# Patient Record
Sex: MALE | Race: WHITE | NOT HISPANIC OR LATINO | Employment: FULL TIME | URBAN - METROPOLITAN AREA
[De-identification: names, ages, dates, MRNs, and addresses within clinical notes are randomized per-mention and may not be internally consistent; named-entity substitution may affect disease eponyms.]

---

## 2022-09-23 ENCOUNTER — OFFICE VISIT (OUTPATIENT)
Dept: UROLOGY | Facility: CLINIC | Age: 57
End: 2022-09-23

## 2022-09-23 VITALS — RESPIRATION RATE: 14 BRPM | BODY MASS INDEX: 25.19 KG/M2 | WEIGHT: 186 LBS | HEIGHT: 72 IN

## 2022-09-23 DIAGNOSIS — R97.20 ELEVATED PROSTATE SPECIFIC ANTIGEN (PSA): Primary | ICD-10-CM

## 2022-09-23 LAB
BILIRUB BLD-MCNC: NEGATIVE MG/DL
CLARITY, POC: CLEAR
COLOR UR: YELLOW
EXPIRATION DATE: NORMAL
GLUCOSE UR STRIP-MCNC: NEGATIVE MG/DL
KETONES UR QL: NEGATIVE
LEUKOCYTE EST, POC: NEGATIVE
Lab: NORMAL
NITRITE UR-MCNC: NEGATIVE MG/ML
PH UR: 7 [PH] (ref 5–8)
PROT UR STRIP-MCNC: NEGATIVE MG/DL
RBC # UR STRIP: NEGATIVE /UL
SP GR UR: 1.01 (ref 1–1.03)
UROBILINOGEN UR QL: NORMAL

## 2022-09-23 PROCEDURE — 81003 URINALYSIS AUTO W/O SCOPE: CPT | Performed by: NURSE PRACTITIONER

## 2022-09-23 PROCEDURE — 99203 OFFICE O/P NEW LOW 30 MIN: CPT | Performed by: NURSE PRACTITIONER

## 2022-09-23 RX ORDER — LACTOBACILLUS ACIDOPHILUS 20B CELL
1 CAPSULE ORAL DAILY
Qty: 28 CAPSULE | Refills: 0 | Status: SHIPPED | OUTPATIENT
Start: 2022-09-23

## 2022-09-23 RX ORDER — MULTIPLE VITAMINS W/ MINERALS TAB 9MG-400MCG
1 TAB ORAL DAILY
COMMUNITY

## 2022-09-23 RX ORDER — LANSOPRAZOLE 15 MG/1
15 CAPSULE, DELAYED RELEASE ORAL DAILY
COMMUNITY

## 2022-09-23 RX ORDER — DOXYCYCLINE HYCLATE 100 MG/1
100 CAPSULE ORAL 2 TIMES DAILY
Qty: 56 CAPSULE | Refills: 0 | Status: SHIPPED | OUTPATIENT
Start: 2022-09-23 | End: 2022-10-21

## 2022-09-23 RX ORDER — FEXOFENADINE HYDROCHLORIDE 60 MG/1
60 TABLET, FILM COATED ORAL DAILY
COMMUNITY

## 2022-09-23 NOTE — PROGRESS NOTES
"Chief Complaint: Elevated PSA    Subjective         History of Present Illness  Jonh Moe is a 57 y.o. male presents to [facility] to be seen for elevated PSA.    The patient was seen to establish care with new PCP on 7/24/2022.  Patient had labs ordered at that date of service and his PSA level came back elevated at 4.4.    The patient had not seen a primary care provider for 11 years prior to establishing care with Sarita Vargas.    Nocturia x 0     Denies straining to void.     Weaker stream.  Sometimes slow.     Some post void dribble.    No urgency or frequency.     He does C/O ed state he can get an erection but it fades quickly.    Paternal history of prostate cancer and paternal uncle.    Objective     History reviewed. No pertinent past medical history.    Past Surgical History:   Procedure Laterality Date   • HERNIA REPAIR           Current Outpatient Medications:   •  fexofenadine (ALLEGRA) 60 MG tablet, Take 60 mg by mouth Daily., Disp: , Rfl:   •  lansoprazole (PREVACID) 15 MG capsule, Take 15 mg by mouth Daily., Disp: , Rfl:   •  multivitamin with minerals (MULTIVITAMIN ADULTS 50+ PO), Take 1 tablet by mouth Daily., Disp: , Rfl:   •  doxycycline (VIBRAMYCIN) 100 MG capsule, Take 1 capsule by mouth 2 (Two) Times a Day for 28 days., Disp: 56 capsule, Rfl: 0  •  Lactobacillus (Florajen Acidophilus) capsule, Take 1 capsule by mouth Daily., Disp: 28 capsule, Rfl: 0    No Known Allergies     History reviewed. No pertinent family history.    Social History     Socioeconomic History   • Marital status:    Tobacco Use   • Smoking status: Former Smoker   • Smokeless tobacco: Never Used   Vaping Use   • Vaping Use: Never used       Vital Signs:   Resp 14   Ht 182.9 cm (72\")   Wt 84.4 kg (186 lb)   BMI 25.23 kg/m²      Physical Exam  Genitourinary:     Prostate: Enlarged (moderate). Not tender and no nodules present.      Comments: boggy texture of prostate     "     Result Review :   The following data was reviewed by: TONJA Juarez on 09/23/2022:  Results for orders placed or performed in visit on 09/23/22   POC Urinalysis Dipstick, Automated    Specimen: Urine   Result Value Ref Range    Color Yellow Yellow, Straw, Dark Yellow, Erin    Clarity, UA Clear Clear    Specific Gravity  1.010 1.005 - 1.030    pH, Urine 7.0 5.0 - 8.0    Leukocytes Negative Negative    Nitrite, UA Negative Negative    Protein, POC Negative Negative mg/dL    Glucose, UA Negative Negative mg/dL    Ketones, UA Negative Negative    Urobilinogen, UA 0.2 E.U./dL Normal, 0.2 E.U./dL    Bilirubin Negative Negative    Blood, UA Negative Negative    Lot Number [id number]     Expiration Date 8/2,023             Procedures        Assessment and Plan    Diagnoses and all orders for this visit:    1. Elevated prostate specific antigen (PSA) (Primary)  -     POC Urinalysis Dipstick, Automated  -     doxycycline (VIBRAMYCIN) 100 MG capsule; Take 1 capsule by mouth 2 (Two) Times a Day for 28 days.  Dispense: 56 capsule; Refill: 0  -     Lactobacillus (Florajen Acidophilus) capsule; Take 1 capsule by mouth Daily.  Dispense: 28 capsule; Refill: 0  -     PSA Diagnostic; Future      Given his prostate exam and elevated PSA I would like to try treating him with antibiotics prior to repeating his PSA in 6 weeks.        I spent 15 minutes caring for Jonh on this date of service. This time includes time spent by me in the following activities:reviewing tests, obtaining and/or reviewing a separately obtained history, performing a medically appropriate examination and/or evaluation , counseling and educating the patient/family/caregiver, ordering medications, tests, or procedures, and documenting information in the medical record  Follow Up   Return in about 6 weeks (around 11/4/2022) for f/u elevated PSA With Dr. montana.  Patient was given instructions and counseling regarding his condition or for health  maintenance advice. Please see specific information pulled into the AVS if appropriate.         This document has been electronically signed by TONJA Juarez  September 23, 2022 15:09 EDT

## 2022-11-01 ENCOUNTER — LAB (OUTPATIENT)
Dept: LAB | Facility: HOSPITAL | Age: 57
End: 2022-11-01

## 2022-11-01 DIAGNOSIS — R97.20 ELEVATED PROSTATE SPECIFIC ANTIGEN (PSA): ICD-10-CM

## 2022-11-01 PROCEDURE — 36415 COLL VENOUS BLD VENIPUNCTURE: CPT

## 2022-11-01 PROCEDURE — 84153 ASSAY OF PSA TOTAL: CPT

## 2022-11-02 LAB — PSA SERPL-MCNC: 4.15 NG/ML (ref 0–4)

## 2022-11-14 PROBLEM — R97.20 ELEVATED PSA: Status: ACTIVE | Noted: 2022-11-14

## 2022-11-14 NOTE — PROGRESS NOTES
Chief Complaint: Urologic complaint    Subjective         History of Present Illness  Jonh Moe is a 57 y.o. male       Elevated PSA      9/23/2022 saw nurse practitioner -doxycycline x1 month -can tell things are better better stream.  No side effects    Has not seen PCP for 11 years before this    Nocturia x0, weak stream  - no  straining.  No urgency/frequency.    Some trouble with losing erection quickly.    Paternal history of prostate cancer in 60s.       No Gross hematuria/ UTI    No cardiopulmonary history, no anticoagulation.  Non-smoker      PSA    11/22     4.1  7/22       4.4          Objective     No past medical history on file.    Past Surgical History:   Procedure Laterality Date   • HERNIA REPAIR           Current Outpatient Medications:   •  fexofenadine (ALLEGRA) 60 MG tablet, Take 60 mg by mouth Daily., Disp: , Rfl:   •  Lactobacillus (Florajen Acidophilus) capsule, Take 1 capsule by mouth Daily., Disp: 28 capsule, Rfl: 0  •  lansoprazole (PREVACID) 15 MG capsule, Take 15 mg by mouth Daily., Disp: , Rfl:   •  multivitamin with minerals (MULTIVITAMIN ADULTS 50+ PO), Take 1 tablet by mouth Daily., Disp: , Rfl:     No Known Allergies     No family history on file.    Social History     Socioeconomic History   • Marital status:    Tobacco Use   • Smoking status: Former   • Smokeless tobacco: Never   Vaping Use   • Vaping Use: Never used       Vital Signs:   There were no vitals taken for this visit.     Physical exam    Alert and orient x3  Well appearing, well developed, in no acute distress   Unlabored respirations  Nontender/nondistended      Grossly oriented to person, place and time, judgment is intact, normal mood and affect      Results for orders placed or performed in visit on 11/01/22   PSA Diagnostic    Specimen: Blood   Result Value Ref Range    PSA 4.150 (H) 0.000 - 4.000 ng/mL             Assessment and Plan    Diagnoses and all orders for this visit:    1. Elevated  PSA (Primary)       Records reviewed today and summarized in chart      Patient with elevated PSA for his age.  It has come down marginally since his antibiotics.  Still elevated.  We discussed this today.  We did discuss MRI prostate versus conservatively managing with serial PSAs.    AT this time after discussion we will go ahead and get a PSA in 3 months.  Depending on what this PSA shows we will determine further management    Patient understands we are ruling out prostate cancer and he must follow-up.

## 2022-11-15 ENCOUNTER — OFFICE VISIT (OUTPATIENT)
Dept: UROLOGY | Facility: CLINIC | Age: 57
End: 2022-11-15

## 2022-11-15 VITALS — HEIGHT: 72 IN | BODY MASS INDEX: 25.14 KG/M2 | WEIGHT: 185.6 LBS | RESPIRATION RATE: 14 BRPM

## 2022-11-15 DIAGNOSIS — R97.20 ELEVATED PSA: Primary | ICD-10-CM

## 2022-11-15 PROCEDURE — 99213 OFFICE O/P EST LOW 20 MIN: CPT | Performed by: UROLOGY

## 2023-02-13 ENCOUNTER — TELEPHONE (OUTPATIENT)
Dept: SURGERY | Facility: CLINIC | Age: 58
End: 2023-02-13
Payer: COMMERCIAL

## 2023-02-13 DIAGNOSIS — R97.20 ELEVATED PSA: Primary | ICD-10-CM

## 2023-02-20 ENCOUNTER — LAB (OUTPATIENT)
Dept: LAB | Facility: HOSPITAL | Age: 58
End: 2023-02-20
Payer: COMMERCIAL

## 2023-02-20 DIAGNOSIS — R97.20 ELEVATED PSA: ICD-10-CM

## 2023-02-20 LAB — PSA SERPL-MCNC: 5.82 NG/ML (ref 0–4)

## 2023-02-20 PROCEDURE — 84153 ASSAY OF PSA TOTAL: CPT

## 2023-02-20 PROCEDURE — 36415 COLL VENOUS BLD VENIPUNCTURE: CPT

## 2023-02-25 NOTE — PROGRESS NOTES
Chief Complaint: Urologic complaint    Subjective         History of Present Illness  Jonh Moe is a 58 y.o. male       Elevated PSA  Family history of prostate cancer      Patient's voiding issue.  No straining. Noc X 0. No prostate meds.  Not bothered      NO GH/UTI      9/23/2022 saw nurse practitioner -doxycycline x1 month -can tell things are better better stream.  No side effects      Some trouble with losing erection quickly.    Paternal history of prostate cancer in 60s.       No Gross hematuria/ UTI    No cardiopulmonary history, no anticoagulation.  Non-smoker      PSA    2/23       5.8   11/22     4.1  7/22       4.4          Objective     No past medical history on file.    Past Surgical History:   Procedure Laterality Date   • HERNIA REPAIR           Current Outpatient Medications:   •  fexofenadine (ALLEGRA) 60 MG tablet, Take 60 mg by mouth Daily., Disp: , Rfl:   •  Lactobacillus (Florajen Acidophilus) capsule, Take 1 capsule by mouth Daily., Disp: 28 capsule, Rfl: 0  •  lansoprazole (PREVACID) 15 MG capsule, Take 15 mg by mouth Daily., Disp: , Rfl:   •  multivitamin with minerals tablet tablet, Take 1 tablet by mouth Daily., Disp: , Rfl:     No Known Allergies     No family history on file.    Social History     Socioeconomic History   • Marital status:    Tobacco Use   • Smoking status: Former   • Smokeless tobacco: Never   Vaping Use   • Vaping Use: Never used       Vital Signs:   There were no vitals taken for this visit.           Results for orders placed or performed in visit on 02/20/23   PSA Diagnostic    Specimen: Blood   Result Value Ref Range    PSA 5.820 (H) 0.000 - 4.000 ng/mL             Assessment and Plan    Diagnoses and all orders for this visit:    1. Elevated PSA (Primary)        PSA has increased, he does have have a family history of prostate cancer.  After discussion we will get an MRI prostate and follow-up after      Patient understands  we are ruling out  prostate cancer and he must follow-up

## 2023-02-27 ENCOUNTER — OFFICE VISIT (OUTPATIENT)
Dept: UROLOGY | Facility: CLINIC | Age: 58
End: 2023-02-27
Payer: COMMERCIAL

## 2023-02-27 VITALS — WEIGHT: 189.4 LBS | HEIGHT: 72 IN | RESPIRATION RATE: 14 BRPM | BODY MASS INDEX: 25.65 KG/M2

## 2023-02-27 DIAGNOSIS — R97.20 ELEVATED PSA: Primary | ICD-10-CM

## 2023-02-27 PROCEDURE — 99213 OFFICE O/P EST LOW 20 MIN: CPT | Performed by: UROLOGY

## 2023-02-27 RX ORDER — CHLORAL HYDRATE 500 MG
CAPSULE ORAL
COMMUNITY

## 2023-02-27 RX ORDER — MAGNESIUM OXIDE 400 MG/1
400 TABLET ORAL DAILY
COMMUNITY

## 2023-03-07 ENCOUNTER — TELEPHONE (OUTPATIENT)
Dept: UROLOGY | Facility: CLINIC | Age: 58
End: 2023-03-07
Payer: COMMERCIAL

## 2023-03-07 DIAGNOSIS — R97.20 ELEVATED PSA: Primary | ICD-10-CM

## 2023-03-07 NOTE — TELEPHONE ENCOUNTER
Spoke to pt spouse giving her MRI Prostate appt information. [facility] 03/24/23 at 0900, arrive at 0830. No prep. She also agreed to move follow up to 03/28/23 at 4:00pm.

## 2023-03-26 NOTE — PROGRESS NOTES
Chief Complaint: Urologic complaint    Subjective         History of Present Illness  Jonh Moe is a 58 y.o. male       Elevated PSA  Family history of prostate cancer  ED    No changes to his medical history    Follows up today with MRI prostate    Voiding unchanged    Patient's voiding issue.  No straining. Noc X 0. No prostate meds.  Not bothered    No symptoms of prostatitis    9/23/2022 saw nurse practitioner -doxycycline x1 month -can tell things are better better stream.  No side effects    Some trouble with losing erection quickly. No treatment currenlty    Paternal history of prostate cancer in 60s.         No cardiopulmonary history, no anticoagulation.  Non-smoker      PSA    3/24/2023 MRI prostate-72 g, negative, chronic prostatitis   PSAd   0.08  2/23       5.8  -after doxycycline x1 month  11/22     4.1  7/22       4.4          Objective     No past medical history on file.    Past Surgical History:   Procedure Laterality Date   • HERNIA REPAIR           Current Outpatient Medications:   •  fexofenadine (ALLEGRA) 60 MG tablet, Take 60 mg by mouth Daily., Disp: , Rfl:   •  Inulin-Cholecalciferol (FIBER/D3 ADULT GUMMIES PO), Take  by mouth., Disp: , Rfl:   •  Lactobacillus (Florajen Acidophilus) capsule, Take 1 capsule by mouth Daily., Disp: 28 capsule, Rfl: 0  •  lansoprazole (PREVACID) 15 MG capsule, Take 15 mg by mouth Daily., Disp: , Rfl:   •  magnesium oxide (MAG-OX) 400 MG tablet, Take 400 mg by mouth Daily., Disp: , Rfl:   •  multivitamin with minerals tablet tablet, Take 1 tablet by mouth Daily., Disp: , Rfl:   •  Omega-3 Fatty Acids (fish oil) 1000 MG capsule capsule, Take  by mouth Daily With Breakfast., Disp: , Rfl:     No Known Allergies     No family history on file.    Social History     Socioeconomic History   • Marital status:    Tobacco Use   • Smoking status: Former   • Smokeless tobacco: Never   Vaping Use   • Vaping Use: Never used       Vital Signs:   There were no  vitals taken for this visit.           Results for orders placed or performed in visit on 02/20/23   PSA Diagnostic    Specimen: Blood   Result Value Ref Range    PSA 5.820 (H) 0.000 - 4.000 ng/mL             Assessment and Plan    Diagnoses and all orders for this visit:    1. Elevated PSA (Primary)        ED      Patient bothered by ED, will go and give him a prescription for sildenafil 20 mg 1-5 tablets as needed 6 or intercourse.  Risk and benefits discussed.  Patient understands he has erection for greater than 4 hours should go to the emergency room or risk worse erections in the future        Elevated PSA      PSA within normal limits for his size of prostate based on MRI.  PSA density normal.  Patient given reassurance we will continue to follow  as he does have a family history of prostate cancer    I do think yearly PSA is reasonable at this point    Follow-up in 1 year with PSA  With NP

## 2023-03-28 ENCOUNTER — OFFICE VISIT (OUTPATIENT)
Dept: UROLOGY | Facility: CLINIC | Age: 58
End: 2023-03-28
Payer: COMMERCIAL

## 2023-03-28 VITALS — RESPIRATION RATE: 14 BRPM | HEIGHT: 72 IN | WEIGHT: 192.2 LBS | BODY MASS INDEX: 26.03 KG/M2

## 2023-03-28 DIAGNOSIS — N52.01 ERECTILE DYSFUNCTION DUE TO ARTERIAL INSUFFICIENCY: ICD-10-CM

## 2023-03-28 DIAGNOSIS — R97.20 ELEVATED PSA: Primary | ICD-10-CM

## 2023-03-28 PROCEDURE — 99213 OFFICE O/P EST LOW 20 MIN: CPT | Performed by: UROLOGY

## 2023-03-28 RX ORDER — SILDENAFIL CITRATE 20 MG/1
TABLET ORAL
Qty: 30 TABLET | Refills: 10 | Status: SHIPPED | OUTPATIENT
Start: 2023-03-28

## 2023-09-18 ENCOUNTER — OFFICE VISIT (OUTPATIENT)
Dept: ORTHOPEDIC SURGERY | Facility: CLINIC | Age: 58
End: 2023-09-18
Payer: COMMERCIAL

## 2023-09-18 VITALS
OXYGEN SATURATION: 98 % | HEIGHT: 72 IN | HEART RATE: 89 BPM | WEIGHT: 192 LBS | DIASTOLIC BLOOD PRESSURE: 89 MMHG | SYSTOLIC BLOOD PRESSURE: 167 MMHG | BODY MASS INDEX: 26.01 KG/M2

## 2023-09-18 DIAGNOSIS — M25.561 RIGHT KNEE PAIN, UNSPECIFIED CHRONICITY: Primary | ICD-10-CM

## 2023-09-18 NOTE — PROGRESS NOTES
"Chief Complaint  Initial Evaluation of the Right Knee     Subjective      Jonh Moe presents to [facility] for initial evaluation of the right knee. He has pain on the medial aspect of the knee. He has had pain for awhile.  He feels he tore something 7 - 8 years ago.  He treated conservatively with bracing at that time.  He has difficulty with twisting, turning, uneven surfaces and stairs.     No Known Allergies     Social History     Socioeconomic History    Marital status:    Tobacco Use    Smoking status: Former     Types: Cigarettes    Smokeless tobacco: Never    Tobacco comments:     Quit smoking about 20 years ago   Vaping Use    Vaping Use: Never used   Substance and Sexual Activity    Alcohol use: Yes     Alcohol/week: 2.0 standard drinks     Types: 2 Shots of liquor per week    Drug use: Never    Sexual activity: Yes     Partners: Female     Birth control/protection: Hysterectomy        I reviewed the patient's chief complaint, history of present illness, review of systems, past medical history, surgical history, family history, social history, medications, and allergy list.     Review of Systems     Constitutional: Denies fevers, chills, weight loss  Cardiovascular: Denies chest pain, shortness of breath  Skin: Denies rashes, acute skin changes  Neurologic: Denies headache, loss of consciousness  M      Vital Signs:   /89   Pulse 89   Ht 182.9 cm (72\")   Wt 87.1 kg (192 lb)   SpO2 98%   BMI 26.04 kg/m²          Physical Exam  General: Alert. No acute distress    Ortho Exam        RIGHT KNEE Flexion 120. Extension 0. Stable to varus/valgus stress. Stable to anterior/posterior drawer. Neurovascularly intact. Negative Carmen. Negative Lachman. Positive EHL, FHL, HS and TA. Sensation intact to light touch all 5 nerves of the foot. Ambulates with Antalgic gait. Patella is well tracking. Calf supple, non-tender. Positive tenderness to the medial joint " line. Negative tenderness to the lateral joint line. Negative Crepitus. Good strength to hamstrings, quadriceps, dorsiflexors, and plantar flexors.  Knee Extensor Mechanism intact        Procedures        Imaging Results (Most Recent)       None             Result Review :       PROCEDURE: XR KNEE 3 VIEWS 33877  REASON FOR EXAM: Pain  COMPARISON: None.  FINDINGS: Bone demineralization. No acute fracture or dislocation.  Mild tricompartmental knee joint space narrowing.  No acute or suspicious soft tissue abnormality.  IMPRESSION: 1. No acute fracture or dislocation. 2. Mild tricompartmental knee joint space narrowing.       Assessment and Plan     Diagnoses and all orders for this visit:    1. Right knee pain, unspecified chronicity (Primary)        Discussed the treatment plan with the patient. I reviewed the X-rays that were obtained on CD with the patient.       MRI of the right knee.       Call or return if worsening symptoms.    Follow Up     After MRI of the right knee.       Patient was given instructions and counseling regarding his condition or for health maintenance advice. Please see specific information pulled into the AVS if appropriate.     Scribed for Tanner Pacheco MD by Thu Jacob MA.  09/18/23   15:42 EDT    I have personally performed the services described in this document as scribed by the above individual and it is both accurate and complete. Tanner Pacheco MD 09/20/23

## 2023-10-25 ENCOUNTER — HOSPITAL ENCOUNTER (OUTPATIENT)
Dept: MRI IMAGING | Facility: HOSPITAL | Age: 58
Discharge: HOME | End: 2023-10-25
Admitting: ORTHOPAEDIC SURGERY
Payer: COMMERCIAL

## 2023-10-25 DIAGNOSIS — M25.561 RIGHT KNEE PAIN, UNSPECIFIED CHRONICITY: ICD-10-CM

## 2023-10-25 PROCEDURE — 73721 MRI JNT OF LWR EXTRE W/O DYE: CPT

## 2024-03-19 ENCOUNTER — LAB (OUTPATIENT)
Dept: LAB | Facility: HOSPITAL | Age: 59
End: 2024-03-19
Payer: COMMERCIAL

## 2024-03-19 DIAGNOSIS — R97.20 ELEVATED PSA: ICD-10-CM

## 2024-03-19 DIAGNOSIS — R97.20 ELEVATED PSA: Primary | ICD-10-CM

## 2024-03-19 LAB — PSA SERPL-MCNC: 6.32 NG/ML (ref 0–4)

## 2024-03-19 PROCEDURE — 36415 COLL VENOUS BLD VENIPUNCTURE: CPT

## 2024-03-19 PROCEDURE — 84153 ASSAY OF PSA TOTAL: CPT

## 2024-03-27 NOTE — PROGRESS NOTES
Chief Complaint: Elevated PSA    Subjective         History of Present Illness  Jonh Moe is a 59 y.o. male presents to [facility] to be seen for follow-up.    Patient was previously seen by Dr. Bruce Ruano with last visit on 3/28/2023 for elevated PSA/ED.  He was given a prescription for sildenafil.  He was also advised that his PSA was within normal limits for the size of his prostate based on his MRI.  His PSA density was within normal limits.  He was advised to follow-up 1 year with PSA prior.  He is here for that follow-up.      Denies any urinary symptoms.         PSA  3/19/2024 6.32, PSAd 0.09    Previous 3/28/2023:  Elevated PSA  Family history of prostate cancer  ED     No changes to his medical history     Follows up today with MRI prostate     Voiding unchanged     Patient's voiding issue.  No straining. Noc X 0. No prostate meds.  Not bothered     No symptoms of prostatitis     9/23/2022 saw nurse practitioner -doxycycline x1 month -can tell things are better better stream.  No side effects     Some trouble with losing erection quickly. No treatment currenlty     Paternal history of prostate cancer in 60s.           No cardiopulmonary history, no anticoagulation.  Non-smoker        PSA     3/24/2023 MRI prostate-72 g, negative, chronic prostatitis   PSAd   0.08  2/23       5.8  -after doxycycline x1 month  11/22     4.1  7/22       4.4       Objective     History reviewed. No pertinent past medical history.    Past Surgical History:   Procedure Laterality Date    HERNIA REPAIR           Current Outpatient Medications:     fexofenadine (ALLEGRA) 60 MG tablet, Take 1 tablet by mouth Daily., Disp: , Rfl:     Inulin-Cholecalciferol (FIBER/D3 ADULT GUMMIES PO), Take  by mouth., Disp: , Rfl:     Lactobacillus (Florajen Acidophilus) capsule, Take 1 capsule by mouth Daily., Disp: 28 capsule, Rfl: 0    lansoprazole (PREVACID) 15 MG capsule, Take 1 capsule by mouth Daily.,  "Disp: , Rfl:     magnesium oxide (MAG-OX) 400 MG tablet, Take 1 tablet by mouth Daily., Disp: , Rfl:     multivitamin with minerals tablet tablet, Take 1 tablet by mouth Daily., Disp: , Rfl:     Omega-3 Fatty Acids (fish oil) 1000 MG capsule capsule, Take  by mouth Daily With Breakfast., Disp: , Rfl:     sildenafil (REVATIO) 20 MG tablet, TAKE 1-5 TABLETS BY MOUTH AS NEEDED 1 HOUR PRIOR TO SEXUAL INTERCOURSE, Disp: 30 tablet, Rfl: 10    doxycycline (VIBRAMYCIN) 100 MG capsule, Take 1 capsule by mouth 2 (Two) Times a Day for 28 days., Disp: 56 capsule, Rfl: 0    No Known Allergies     Family History   Problem Relation Age of Onset    Cancer Mother     Cancer Father         Acute Leukemia       Social History     Socioeconomic History    Marital status:    Tobacco Use    Smoking status: Former     Types: Cigarettes     Passive exposure: Past    Smokeless tobacco: Never    Tobacco comments:     Quit smoking about 20 years ago   Vaping Use    Vaping status: Never Used   Substance and Sexual Activity    Alcohol use: Yes     Alcohol/week: 2.0 standard drinks of alcohol     Types: 2 Shots of liquor per week    Drug use: Never    Sexual activity: Yes     Partners: Female     Birth control/protection: Hysterectomy       Vital Signs:   /87 (BP Location: Left arm, Patient Position: Sitting, Cuff Size: Large Adult)   Pulse 79   Ht 182.9 cm (72\")   Wt 87.1 kg (192 lb 0.3 oz)   BMI 26.04 kg/m²      Physical Exam  Vitals reviewed.   Constitutional:       Appearance: Normal appearance.   Neurological:      General: No focal deficit present.      Mental Status: He is alert and oriented to person, place, and time.   Psychiatric:         Mood and Affect: Mood normal.         Behavior: Behavior normal.          Result Review :   The following data was reviewed by: TONJA Crawley on 03/29/2024:  Results for orders placed or performed in visit on 03/29/24   Bladder Scan   Result Value Ref Range    Urine " Volume 2       PSA          3/19/2024    14:59   PSA   PSA 6.320        Bladder Scan interpretation 03/29/2024    Estimation of residual urine via BVI 3000 [address]athon Bladder Scan  MA/nurse performing: Palak GILLIAM MA  Residual Urine: 2 ml  Indication: Elevated PSA    Elevated prostate specific antigen (PSA)   Position: Supine  Examination: Incremental scanning of the suprapubic area using 2.0 MHz transducer using copious amounts of acoustic gel.   Findings: An anechoic area was demonstrated which represented the bladder, with measurement of residual urine as noted. I inspected this myself. In that the residual urine was stable or insignificant, refer to plan for treatment and plan necessary at this time.         Procedures        Assessment and Plan    Diagnoses and all orders for this visit:    1. Elevated PSA (Primary)  -     Bladder Scan  -     PSA DIAGNOSTIC; Future  -     doxycycline (VIBRAMYCIN) 100 MG capsule; Take 1 capsule by mouth 2 (Two) Times a Day for 28 days.  Dispense: 56 capsule; Refill: 0    2. Elevated prostate specific antigen (PSA)  -     Lactobacillus (Florajen Acidophilus) capsule; Take 1 capsule by mouth Daily.  Dispense: 28 capsule; Refill: 0    Discussed elevated PSA at length with patient.  We did discuss that having larger prostate may be the reason that his PSA continues to slowly climb.  However he would like to treat with an antibiotic and then repeat in a couple of months.  He did report that he works as a  and has bounced around a lot and feels this may be contributing to the elevations.  I did discuss that this is definitely a possibility.  He does have some vacation scheduled in May and would like to get his PSA completed again after a few days off of work to see if it improves.    We did discuss if the PSA remains elevated at that time that we would consider doing the [employer] urine test and if this was elevated to do a repeat MRI with possible biopsy.    I  will see him back in May or sooner for any new concerns.    Follow Up   Return in about 9 weeks (around 5/31/2024).  Patient was given instructions and counseling regarding his condition or for health maintenance advice. Please see specific information pulled into the AVS if appropriate.         This document has been electronically signed by TONJA Crawley  March 29, 2024 15:01 EDT

## 2024-03-29 ENCOUNTER — OFFICE VISIT (OUTPATIENT)
Dept: UROLOGY | Facility: CLINIC | Age: 59
End: 2024-03-29
Payer: COMMERCIAL

## 2024-03-29 VITALS
WEIGHT: 192.02 LBS | HEART RATE: 79 BPM | HEIGHT: 72 IN | DIASTOLIC BLOOD PRESSURE: 87 MMHG | BODY MASS INDEX: 26.01 KG/M2 | SYSTOLIC BLOOD PRESSURE: 164 MMHG

## 2024-03-29 DIAGNOSIS — R97.20 ELEVATED PSA: Primary | ICD-10-CM

## 2024-03-29 DIAGNOSIS — R97.20 ELEVATED PROSTATE SPECIFIC ANTIGEN (PSA): ICD-10-CM

## 2024-03-29 LAB — URINE VOLUME: 2

## 2024-03-29 RX ORDER — LACTOBACILLUS ACIDOPHILUS 20B CELL
1 CAPSULE ORAL DAILY
Qty: 28 CAPSULE | Refills: 0 | Status: SHIPPED | OUTPATIENT
Start: 2024-03-29

## 2024-03-29 RX ORDER — DOXYCYCLINE HYCLATE 100 MG/1
100 CAPSULE ORAL 2 TIMES DAILY
Qty: 56 CAPSULE | Refills: 0 | Status: SHIPPED | OUTPATIENT
Start: 2024-03-29 | End: 2024-04-26

## 2024-05-28 ENCOUNTER — LAB (OUTPATIENT)
Dept: LAB | Facility: HOSPITAL | Age: 59
End: 2024-05-28
Payer: COMMERCIAL

## 2024-05-28 DIAGNOSIS — R97.20 ELEVATED PSA: ICD-10-CM

## 2024-05-28 LAB — PSA SERPL-MCNC: 5.54 NG/ML (ref 0–4)

## 2024-05-28 PROCEDURE — 84153 ASSAY OF PSA TOTAL: CPT

## 2024-05-28 PROCEDURE — 36415 COLL VENOUS BLD VENIPUNCTURE: CPT

## 2024-05-31 NOTE — PROGRESS NOTES
Chief Complaint: Elevated PSA    Subjective         History of Present Illness  Jonh Moe is a 59 y.o. male presents to [facility] to be seen for follow-up.    Patient was previously seen by me with last visit on 3/29/2024 for elevated PSA.  At that visit he did want to treat with an antibiotic and then repeat his PSA to see if it had come back down.  He is here for follow-up.      He is not having any bothersome urinary symptoms.    PSA  5/28/2024 5.54, PSAd 0.08    Previous 3/29/2024:  Patient was previously seen by Dr. Bruce Ruano with last visit on 3/28/2023 for elevated PSA/ED.  He was given a prescription for sildenafil.  He was also advised that his PSA was within normal limits for the size of his prostate based on his MRI.  His PSA density was within normal limits.  He was advised to follow-up 1 year with PSA prior.  He is here for that follow-up.        Denies any urinary symptoms.            PSA  3/19/2024 6.32, PSAd 0.09     Previous 3/28/2023:  Elevated PSA  Family history of prostate cancer  ED     No changes to his medical history     Follows up today with MRI prostate     Voiding unchanged     Patient's voiding issue.  No straining. Noc X 0. No prostate meds.  Not bothered     No symptoms of prostatitis     9/23/2022 saw nurse practitioner -doxycycline x1 month -can tell things are better better stream.  No side effects     Some trouble with losing erection quickly. No treatment currenlty     Paternal history of prostate cancer in 60s.           No cardiopulmonary history, no anticoagulation.  Non-smoker        PSA     3/24/2023 MRI prostate-72 g, negative, chronic prostatitis   PSAd   0.08  2/23       5.8  -after doxycycline x1 month  11/22     4.1  7/22       4.4     Objective     History reviewed. No pertinent past medical history.    Past Surgical History:   Procedure Laterality Date    HERNIA REPAIR           Current Outpatient Medications:      "fexofenadine (ALLEGRA) 60 MG tablet, Take 1 tablet by mouth Daily., Disp: , Rfl:     Inulin-Cholecalciferol (FIBER/D3 ADULT GUMMIES PO), Take  by mouth., Disp: , Rfl:     Lactobacillus (Florajen Acidophilus) capsule, Take 1 capsule by mouth Daily., Disp: 28 capsule, Rfl: 0    lansoprazole (PREVACID) 15 MG capsule, Take 1 capsule by mouth Daily., Disp: , Rfl:     magnesium oxide (MAG-OX) 400 MG tablet, Take 1 tablet by mouth Daily., Disp: , Rfl:     multivitamin with minerals tablet tablet, Take 1 tablet by mouth Daily., Disp: , Rfl:     Omega-3 Fatty Acids (fish oil) 1000 MG capsule capsule, Take  by mouth Daily With Breakfast., Disp: , Rfl:     sildenafil (REVATIO) 20 MG tablet, TAKE 1-5 TABLETS BY MOUTH AS NEEDED 1 HOUR PRIOR TO SEXUAL INTERCOURSE (Patient not taking: Reported on 6/4/2024), Disp: 30 tablet, Rfl: 10    No Known Allergies     Family History   Problem Relation Age of Onset    Cancer Mother     Cancer Father         Acute Leukemia       Social History     Socioeconomic History    Marital status:    Tobacco Use    Smoking status: Former     Types: Cigarettes     Passive exposure: Past    Smokeless tobacco: Never    Tobacco comments:     Quit smoking about 20 years ago   Vaping Use    Vaping status: Never Used   Substance and Sexual Activity    Alcohol use: Yes     Alcohol/week: 2.0 standard drinks of alcohol     Types: 2 Shots of liquor per week    Drug use: Never    Sexual activity: Yes     Partners: Female     Birth control/protection: Hysterectomy       Vital Signs:   /85 (BP Location: Left arm, Patient Position: Sitting, Cuff Size: Large Adult)   Pulse 85   Ht 182.9 cm (72\")   Wt 87.1 kg (192 lb 0.3 oz)   BMI 26.04 kg/m²      Physical Exam  Vitals reviewed.   Constitutional:       Appearance: Normal appearance.   Neurological:      General: No focal deficit present.      Mental Status: He is alert and oriented to person, place, and time.   Psychiatric:         Mood and Affect: Mood " normal.         Behavior: Behavior normal.          Result Review :   The following data was reviewed by: TONJA Crawley on 06/04/2024:  Results for orders placed or performed in visit on 06/04/24   Bladder Scan   Result Value Ref Range    Urine Volume 46       PSA          3/19/2024    14:59 5/28/2024    09:15   PSA   PSA 6.320  5.540        Bladder Scan interpretation 06/04/2024    Estimation of residual urine via BVI 3000 Verathon Bladder Scan  MA/nurse performing: Palak GILLIAM MA  Residual Urine: 46 ml  Indication: Elevated PSA   Position: Supine  Examination: Incremental scanning of the suprapubic area using 2.0 MHz transducer using copious amounts of acoustic gel.   Findings: An anechoic area was demonstrated which represented the bladder, with measurement of residual urine as noted. I inspected this myself. In that the residual urine was stable or insignificant, refer to plan for treatment and plan necessary at this time.         Procedures        Assessment and Plan    Diagnoses and all orders for this visit:    1. Elevated PSA (Primary)  -     Bladder Scan  -     PSA DIAGNOSTIC; Future    Given that his PSA has come down slightly, we will just continue to monitor this closely.  We did discuss that it could be strictly related to the size of his prostate since his PSA density continues to be good.  He would like to repeat the PSA in 6 months and see where we are at that point.    Will see him back in 6 months with a PSA prior or sooner for any new concerns.    Follow Up   Return in about 6 months (around 12/4/2024) for with PSA prior.  Patient was given instructions and counseling regarding his condition or for health maintenance advice. Please see specific information pulled into the AVS if appropriate.         This document has been electronically signed by TONJA Crawley  June 4, 2024 15:10 EDT

## 2024-06-04 ENCOUNTER — OFFICE VISIT (OUTPATIENT)
Dept: UROLOGY | Facility: CLINIC | Age: 59
End: 2024-06-04
Payer: COMMERCIAL

## 2024-06-04 VITALS
SYSTOLIC BLOOD PRESSURE: 141 MMHG | BODY MASS INDEX: 26.01 KG/M2 | DIASTOLIC BLOOD PRESSURE: 85 MMHG | WEIGHT: 192.02 LBS | HEART RATE: 85 BPM | HEIGHT: 72 IN

## 2024-06-04 DIAGNOSIS — R97.20 ELEVATED PSA: Primary | ICD-10-CM

## 2024-06-04 LAB — URINE VOLUME: 46

## 2024-06-04 PROCEDURE — 51798 US URINE CAPACITY MEASURE: CPT | Performed by: NURSE PRACTITIONER

## 2024-06-04 PROCEDURE — 99213 OFFICE O/P EST LOW 20 MIN: CPT | Performed by: NURSE PRACTITIONER

## 2024-11-25 ENCOUNTER — LAB (OUTPATIENT)
Dept: LAB | Facility: HOSPITAL | Age: 59
End: 2024-11-25
Payer: COMMERCIAL

## 2024-11-25 DIAGNOSIS — R97.20 ELEVATED PSA: ICD-10-CM

## 2024-11-25 LAB — PSA SERPL-MCNC: 5.99 NG/ML (ref 0–4)

## 2024-11-25 PROCEDURE — 84153 ASSAY OF PSA TOTAL: CPT

## 2024-11-25 PROCEDURE — 36415 COLL VENOUS BLD VENIPUNCTURE: CPT

## 2024-12-02 NOTE — PROGRESS NOTES
Chief Complaint: Elevated PSA    Subjective         History of Present Illness  Jonh Moe is a 59 y.o. male presents to [facility] to be seen for follow-up.    Patient was previously seen by me with last visit on 6/4/2024 for elevated PSA.  At that visit his PSA had come down slightly so he wanted to monitor closely.  His PSA density remains good.  He is here for follow-up.    He is doing well and denies any bothersome urinary symptoms.        PSA  11/25/2024 5.99, PSAd 0.08      Previous 6/4/2024:  Patient was previously seen by me with last visit on 3/29/2024 for elevated PSA.  At that visit he did want to treat with an antibiotic and then repeat his PSA to see if it had come back down.  He is here for follow-up.        He is not having any bothersome urinary symptoms.     PSA  5/28/2024 5.54, PSAd 0.08     Previous 3/29/2024:  Patient was previously seen by Dr. Bruce Ruano with last visit on 3/28/2023 for elevated PSA/ED.  He was given a prescription for sildenafil.  He was also advised that his PSA was within normal limits for the size of his prostate based on his MRI.  His PSA density was within normal limits.  He was advised to follow-up 1 year with PSA prior.  He is here for that follow-up.        Denies any urinary symptoms.            PSA  3/19/2024 6.32, PSAd 0.09     Previous 3/28/2023:  Elevated PSA  Family history of prostate cancer  ED     No changes to his medical history     Follows up today with MRI prostate     Voiding unchanged     Patient's voiding issue.  No straining. Noc X 0. No prostate meds.  Not bothered     No symptoms of prostatitis     9/23/2022 saw nurse practitioner -doxycycline x1 month -can tell things are better better stream.  No side effects     Some trouble with losing erection quickly. No treatment currenlty     Paternal history of prostate cancer in 60s.           No cardiopulmonary history, no anticoagulation.  Non-smoker        PSA      3/24/2023 MRI prostate-72 g, negative, chronic prostatitis   PSAd   0.08  2/23       5.8  -after doxycycline x1 month  11/22     4.1  7/22       4.4          Objective     Past Medical History:   Diagnosis Date    Elevated PSA     Erectile dysfunction        Past Surgical History:   Procedure Laterality Date    HERNIA REPAIR           Current Outpatient Medications:     Inulin-Cholecalciferol (FIBER/D3 ADULT GUMMIES PO), Take  by mouth., Disp: , Rfl:     Lactobacillus (Florajen Acidophilus) capsule, Take 1 capsule by mouth Daily., Disp: 28 capsule, Rfl: 0    lansoprazole (PREVACID) 15 MG capsule, Take 1 capsule by mouth Daily., Disp: , Rfl:     Loratadine 10 MG capsule, , Disp: , Rfl:     magnesium oxide (MAG-OX) 400 MG tablet, Take 1 tablet by mouth Daily., Disp: , Rfl:     multivitamin with minerals tablet tablet, Take 1 tablet by mouth Daily., Disp: , Rfl:     sildenafil (REVATIO) 20 MG tablet, TAKE 1-5 TABLETS BY MOUTH AS NEEDED 1 HOUR PRIOR TO SEXUAL INTERCOURSE, Disp: 30 tablet, Rfl: 10    No Known Allergies     Family History   Problem Relation Age of Onset    Cancer Mother     Heart disease Mother         Enlarged heart    Kidney cancer Mother     Cancer Father         Acute Leukemia    Heart disease Father         Leaking Valve    Hypertension Father     Prostate cancer Father        Social History     Socioeconomic History    Marital status:    Tobacco Use    Smoking status: Former     Current packs/day: 0.00     Types: Cigarettes     Passive exposure: Past    Smokeless tobacco: Never    Tobacco comments:     Quit smoking about 20 years ago   Vaping Use    Vaping status: Never Used   Substance and Sexual Activity    Alcohol use: Yes     Alcohol/week: 2.0 standard drinks of alcohol     Types: 2 Shots of liquor per week    Drug use: Never    Sexual activity: Not Currently     Partners: Female     Birth control/protection: Hysterectomy     Comment: Erectile dysfunction       Vital Signs:   Ht 182.9  "cm (72\")   Wt 87.1 kg (192 lb)   BMI 26.04 kg/m²      Physical Exam  Vitals reviewed.   Constitutional:       Appearance: Normal appearance.   Neurological:      General: No focal deficit present.      Mental Status: He is alert and oriented to person, place, and time.   Psychiatric:         Mood and Affect: Mood normal.         Behavior: Behavior normal.          Result Review :   The following data was reviewed by: TONJA Crawley on 12/03/2024:  Results for orders placed or performed in visit on 12/03/24   Bladder Scan    Collection Time: 12/03/24  2:52 PM   Result Value Ref Range    Urine Volume 9       PSA          3/19/2024    14:59 5/28/2024    09:15 11/25/2024    14:37   PSA   PSA 6.320  5.540  5.990      Bladder Scan interpretation 12/03/2024    Estimation of residual urine via BVI 3000 Verathon Bladder Scan  MA/nurse performing: PAULY Fraser  Residual Urine: 9 ml  Indication: Elevated PSA   Position: Supine  Examination: Incremental scanning of the suprapubic area using 2.0 MHz transducer using copious amounts of acoustic gel.   Findings: An anechoic area was demonstrated which represented the bladder, with measurement of residual urine as noted. I inspected this myself. In that the residual urine was stable or insignificant, refer to plan for treatment and plan necessary at this time.           Procedures        Assessment and Plan    Diagnoses and all orders for this visit:    1. Elevated PSA (Primary)  -     Bladder Scan    Given the patient's stable PSA and normal PSA density, the patient would like to go back to annual PSA screenings.    Will plan to see the patient back in the office in 1 year with PSA prior or sooner for any new concerns.      Follow Up   No follow-ups on file.  Patient was given instructions and counseling regarding his condition or for health maintenance advice. Please see specific information pulled into the AVS if appropriate.         This document has been " electronically signed by Zulma Jiang, TONJA  December 3, 2024 15:04 EST

## 2024-12-03 ENCOUNTER — OFFICE VISIT (OUTPATIENT)
Dept: UROLOGY | Age: 59
End: 2024-12-03
Payer: COMMERCIAL

## 2024-12-03 VITALS — HEIGHT: 72 IN | WEIGHT: 192 LBS | BODY MASS INDEX: 26.01 KG/M2

## 2024-12-03 DIAGNOSIS — R97.20 ELEVATED PSA: Primary | ICD-10-CM

## 2024-12-03 LAB — URINE VOLUME: 9

## 2024-12-03 RX ORDER — LORATADINE 10 MG/1
CAPSULE, LIQUID FILLED ORAL
COMMUNITY